# Patient Record
Sex: MALE | Race: WHITE
[De-identification: names, ages, dates, MRNs, and addresses within clinical notes are randomized per-mention and may not be internally consistent; named-entity substitution may affect disease eponyms.]

---

## 2021-06-18 ENCOUNTER — HOSPITAL ENCOUNTER (OUTPATIENT)
Dept: HOSPITAL 53 - M WUC | Age: 41
End: 2021-06-18
Attending: PHYSICIAN ASSISTANT
Payer: COMMERCIAL

## 2021-06-18 DIAGNOSIS — R19.7: Primary | ICD-10-CM

## 2021-12-08 ENCOUNTER — HOSPITAL ENCOUNTER (OUTPATIENT)
Dept: HOSPITAL 53 - M OPP | Age: 41
Discharge: HOME | End: 2021-12-08
Attending: INTERNAL MEDICINE
Payer: COMMERCIAL

## 2021-12-08 VITALS — HEIGHT: 67 IN | WEIGHT: 200 LBS | BODY MASS INDEX: 31.39 KG/M2

## 2021-12-08 VITALS — DIASTOLIC BLOOD PRESSURE: 79 MMHG | SYSTOLIC BLOOD PRESSURE: 138 MMHG

## 2021-12-08 DIAGNOSIS — B96.81: ICD-10-CM

## 2021-12-08 DIAGNOSIS — K44.9: ICD-10-CM

## 2021-12-08 DIAGNOSIS — Z79.899: ICD-10-CM

## 2021-12-08 DIAGNOSIS — K64.8: ICD-10-CM

## 2021-12-08 DIAGNOSIS — G47.30: ICD-10-CM

## 2021-12-08 DIAGNOSIS — Z86.19: ICD-10-CM

## 2021-12-08 DIAGNOSIS — Z80.0: ICD-10-CM

## 2021-12-08 DIAGNOSIS — Z12.11: Primary | ICD-10-CM

## 2021-12-08 DIAGNOSIS — R10.13: ICD-10-CM

## 2021-12-08 NOTE — ROOR
________________________________________________________________________________

Patient Name: Kirby Francis         Procedure Date: 12/8/2021 9:47 AM

MRN: [id number]                          Account Number: [id number]

YOB: 1980               Age: 41

Room: [facility]                            Gender: Male

Note Status: Finalized                 

________________________________________________________________________________

 

Procedure:            Total Colonoscopy to Cecum + ileoscopy

Indications:          Screening in patient at increased risk: Family history 

                      of 1st-degree relative with colorectal cancer

Providers:            Christiano Emmanuel MD

Referring MD:         CHIP SAUNDERS MD

Requesting Provider:  

Medicines:            Monitored Anesthesia Care

Complications:        No immediate complications.

________________________________________________________________________________

Procedure:            Pre-Anesthesia Assessment:

                      - The heart rate, respiratory rate, oxygen saturations, 

                      blood pressure, adequacy of pulmonary ventilation, and 

                      response to care were monitored throughout the procedure.

                      The Colonoscope was introduced through the anus and 

                      advanced to the cecum, identified by appendiceal orifice 

                      and ileocecal valve. The colonoscopy was performed 

                      without difficulty. The patient tolerated the procedure 

                      well. The quality of the bowel preparation was excellent.

                                                                                

Findings:

     The perianal and digital rectal examinations were normal.

     Non-bleeding internal hemorrhoids were found during retroflexion. The 

     hemorrhoids were small and Grade I (internal hemorrhoids that do not 

     prolapse).

     No other significant abnormalities were identified in a careful 

     examination of the remainder of the colon.

     The terminal ileum appeared normal.

                                                                                

Impression:           - Non-bleeding internal hemorrhoids.

                      - The examined portion of the ileum was normal.

                      - No specimens collected.

                      - The exam was otherwise normal to the cecum.

Recommendation:       - Patient has a contact number available for 

                      emergencies. The signs and symptoms of potential delayed 

                      complications were discussed with the patient. Return to 

                      normal activities tomorrow. Written discharge 

                      instructions were provided to the patient.

                      - High fiber diet.

                      - Discharge patient to home.

                      - Continue present medications.

                      - Repeat colonoscopy in 10 years for screening purposes.

                      - Return to referring physician.

                      - The findings and recommendations were discussed with 

                      the patient.

                                                                                

Procedure Code(s):    --- Professional ---

                      , Colorectal cancer screening; colonoscopy on 

                      individual at high risk

Diagnosis Code(s):    --- Professional ---

                      Z80.0, Family history of malignant neoplasm of digestive 

                      organs

                      K64.0, First degree hemorrhoids

 

CPT copyright 2019 American Medical [employer]. All rights reserved.

 

The codes documented in this report are preliminary and upon  review may 

be revised to meet current compliance requirements.

 

Christiano Emmanuel MD

____________________

Christiano Emmanuel MD

12/8/2021 10:18:15 AM

Electronically signed by Christiano Emmanuel MD

Number of Addenda: 0

 

Note Initiated On: 12/8/2021 9:47 AM

Estimated Blood Loss: Estimated blood loss: none.

## 2021-12-08 NOTE — CCD
Continuity of Care Document (CCD)

                             Created on: 10/12/2021



Kirby Francis

External Reference #: [id number]

: 1980

Sex: Male



Demographics





                          Address                   [address] [place] [address]  [address]

 

                          Home Phone                [phone]

 

                          Preferred Language        Unknown

 

                          Marital Status            Unknown

 

                          Zoroastrianism Affiliation     Unknown

 

                          Race                      Unknown

 

                          Ethnic Group              Unknown





Author





                          Author                    Kirby EMMANUEL M.D.

 

                          Organization              Unknown

 

                          Address                   [address] [place] [address]  [address]



 

                          Phone                     [phone]







Care Team Providers





                    Care Team Member Name Role                Phone

 

                    Kirby Oshea AUTM                [phone]







Problems





                    Active Problems     Provider            Date

 

                    Helicobacter pylori gastrointestinal tract infection Christiano Emmanuel M.D. 

Onset: 10/12/2021

 

                                        Note: HISTORY OF H PYLORI 







Social History





                Type            Date            Description     Comments

 

                Birth Sex                       Unknown          

 

                ETOH Use                        Denies alcohol use  

 

                Tobacco Use     Start: Unknown  Patient has never smoked  







Allergies and adverse reactions





                                        Description

 

                                        No Known Drug Allergies







Medications





           Active Medications SIG        Qnty       Indications Ordering Provide

r Date

 

                                        Suprep Bowel Prep Kit                   

  17.5-3.13-1.6GM/177ML Solution        

             use as directed 354ml                     Christiano Emmanuel M.D. 

10/12/2021

 

           Zoloft                     100mg Tablets                             

                       Unknown    









Immunizations





                                        Description

 

                                        No Information Available







Vital Signs





                Date            Vital           Result          Comment

 

                10/12/2021 12:04pm Height          66 inches       5'6"

 

                    Weight              200.00 lb            

 

                    BP Systolic         123 mmHg             

 

                    BP Diastolic        83 mmHg              

 

                    Heart Rate          70 /min              

 

                    BMI (Body Mass Index) 32.3 kg/m2           

 

                    Weight              90.720 kg            

 

                    Body Temperature    98.6 F             







Results





                                        Description

 

                                        No Information Available







Procedures





                Date            Code            Description     Status

 

                10/12/2021      48115           Office/Outpatient New Moderate M

DM 45-59 Minutes Completed







Medical Devices





                                        Description

 

                                        No Information Available







Encounters





           Type       Date       Location   Provider   Dx         Diagnosis

 

           Office Visit 10/12/2021 11:30a Main Office Christiano Emmanuel M.D. K

21.9      

Gastro-esophageal reflux disease without esophagitis

 

                          K58.9                     Irritable bowel syndrome wit

hout diarrhea







Assessments





                Date            Code            Description     Provider

 

                10/12/2021      K21.9           Gastroesophageal reflux disease 

Christiano Emmanuel M.D.

 

                10/12/2021      K58.9           Irritable bowel syndrome Christiano Emmanuel M.D.







Plan of Treatment

Future Appointment(s):* 2021  6:30 am - Cynthia at Main Office

* 2021 11:45 am - Christiano Emmanuel M.D. at Main Office

10/12/2021 - Christiano Emmanuel M.D.* K21.9 Gastroesophageal reflux disease* 
  Comments:* 40 yo wm who presents for a colonoscopy/egd due to a h/o 
  diarrhea/abdominal pain/gerd/positive h/o H. Pylori.  Positive c/o abdominal 
  pain, no weight loss, positive change in bowel habits, or rectal bleeding. No 
  family h/o colon cancer. No h/o chest pain, or sob.  Plan:1. Colonoscopy + 
  egd2. Informed consent.





* K58.9 Irritable bowel syndrome* Comments:* As above.









Functional Status





                                        Description

 

                                        No Information Available







Mental Status





                                        Description

 

                                        No Information Available







Referrals





                Refer to Dr     Reason for Referral Status          Appt Date

 

                Christiano Emmanuel M.D.                 Created         

000

 

                                        [address] [place] [address] [phone]

## 2021-12-08 NOTE — ROOR
________________________________________________________________________________

Patient Name: Kirby Francis         Procedure Date: 12/8/2021 9:46 AM

MRN: [id number]                          Account Number: [id number]

YOB: 1980               Age: 41

Room: [facility]                            Gender: Male

Note Status: Finalized                 

________________________________________________________________________________

 

Procedure:            Upper Endoscopy + Biopsies

Indications:          Epigastric abdominal pain, Heartburn

Providers:            Christiano Emmanuel MD

Referring MD:         CHIP SAUNDERS MD

Requesting Provider:  

Medicines:            Monitored Anesthesia Care

Complications:        No immediate complications.

________________________________________________________________________________

Procedure:            Pre-Anesthesia Assessment:

                      - The heart rate, respiratory rate, oxygen saturations, 

                      blood pressure, adequacy of pulmonary ventilation, and 

                      response to care were monitored throughout the procedure.

                      The Endoscope was introduced through the mouth, and 

                      advanced to the second part of duodenum. The upper GI 

                      endoscopy was accomplished without difficulty. The 

                      patient tolerated the procedure well.

                                                                                

Findings:

     The Z-line was regular and was found 35 cm from the incisors. Multiple 

     biopsies were obtained with cold forceps for evaluation to rule out 

     Benjamin's Esophagus randomly at the gastroesophageal junction.

     A small hiatal hernia was present.

     No other significant abnormalities were identified in a careful 

     examination of the stomach.

     Biopsies were taken with a cold forceps in the gastric antrum for 

     Helicobacter pylori testing.

     The exam of the duodenum was otherwise normal.

                                                                                

Impression:           - Z-line regular, 35 cm from the incisors.

                      - Small hiatal hernia.

                      - Multiple biopsies were obtained at the 

                      gastroesophageal junction.

                      - Biopsies were taken with a cold forceps for 

                      Helicobacter pylori testing.

                      - The examination was otherwise normal.

Recommendation:       - Patient has a contact number available for 

                      emergencies. The signs and symptoms of potential delayed 

                      complications were discussed with the patient. Return to 

                      normal activities tomorrow. Written discharge 

                      instructions were provided to the patient.

                      - High fiber diet.

                      - Discharge patient to home.

                      - Follow an antireflux regimen.

                      - Continue present medications.

                      - Await pathology results.

                      - Telephone GI clinic for pathology results in 1 week.

                      - Return to referring physician.

                      - The findings and recommendations were discussed with 

                      the patient.

                                                                                

Procedure Code(s):    --- Professional ---

                      35232, Esophagogastroduodenoscopy, flexible, transoral; 

                      with biopsy, single or multiple

Diagnosis Code(s):    --- Professional ---

                      K44.9, Diaphragmatic hernia without obstruction or 

                      gangrene

                      R10.13, Epigastric pain

                      R12, Heartburn

 

CPT copyright 2019 American Medical Association. All rights reserved.

 

The codes documented in this report are preliminary and upon  review may 

be revised to meet current compliance requirements.

 

Christiano Emmanuel MD

____________________

Christiano Emmanuel MD

12/8/2021 10:03:15 AM

Electronically signed by Christiano Emmanuel MD

Number of Addenda: 0

 

Note Initiated On: 12/8/2021 9:46 AM

Estimated Blood Loss: Estimated blood loss: none.

## 2021-12-08 NOTE — CCD
Continuity of Care Document (CCD)

                             Created on: 10/12/2021



Kirby Francis

External Reference #: [id number]

: 1980

Sex: Male



Demographics





                          Address                   [address] [place] [address]  [address]

 

                          Home Phone                [phone]

 

                          Preferred Language        Unknown

 

                          Marital Status            Unknown

 

                          Samaritan Affiliation     Unknown

 

                          Race                      Unknown

 

                          Ethnic Group              Unknown





Author





                          Author                    Kirby EMMANUEL M.D.

 

                          Organization              Unknown

 

                          Address                   [address] [place] [address]  [address]



 

                          Phone                     [phone]







Care Team Providers





                    Care Team Member Name Role                Phone

 

                    Kirby Oshea AUTM                [phone]







Problems





                    Active Problems     Provider            Date

 

                    Helicobacter pylori gastrointestinal tract infection Christiano Emmanuel M.D. 

Onset: 10/12/2021

 

                                        Note: HISTORY OF H PYLORI 







Social History





                Type            Date            Description     Comments

 

                Birth Sex                       Unknown          

 

                ETOH Use                        Denies alcohol use  

 

                Tobacco Use     Start: Unknown  Patient has never smoked  







Allergies and adverse reactions





                                        Description

 

                                        No Known Drug Allergies







Medications





           Active Medications SIG        Qnty       Indications Ordering Provide

r Date

 

                                        Suprep Bowel Prep Kit                   

  17.5-3.13-1.6GM/177ML Solution        

             use as directed 354ml                     Christiano Emmanuel M.D. 

10/12/2021

 

           Zoloft                     100mg Tablets                             

                       Unknown    









Immunizations





                                        Description

 

                                        No Information Available







Vital Signs





                Date            Vital           Result          Comment

 

                10/12/2021 12:04pm Height          66 inches       5'6"

 

                    Weight              200.00 lb            

 

                    BP Systolic         123 mmHg             

 

                    BP Diastolic        83 mmHg              

 

                    Heart Rate          70 /min              

 

                    BMI (Body Mass Index) 32.3 kg/m2           

 

                    Weight              90.720 kg            

 

                    Body Temperature    98.6 F             







Results





                                        Description

 

                                        No Information Available







Procedures





                Date            Code            Description     Status

 

                10/12/2021      36876           Office/Outpatient New Moderate M

DM 45-59 Minutes Completed







Medical Devices





                                        Description

 

                                        No Information Available







Encounters





           Type       Date       Location   Provider   Dx         Diagnosis

 

           Office Visit 10/12/2021 11:30a Main Office Christiano Emmanuel M.D. K

21.9      

Gastro-esophageal reflux disease without esophagitis

 

                          K58.9                     Irritable bowel syndrome wit

hout diarrhea







Assessments





                Date            Code            Description     Provider

 

                10/12/2021      K21.9           Gastroesophageal reflux disease 

Christiano Emmanuel M.D.

 

                10/12/2021      K58.9           Irritable bowel syndrome Christiano Emmanuel M.D.







Plan of Treatment

Future Appointment(s):* 2021  6:30 am - Cynthia at Main Office

* 2021 11:45 am - Christiano Emmanuel M.D. at Main Office

10/12/2021 - Christiano Emmanuel M.D.* K21.9 Gastroesophageal reflux disease* 
  Comments:* 40 yo wm who presents for a colonoscopy/egd due to a h/o 
  diarrhea/abdominal pain/gerd/positive h/o H. Pylori.  Positive c/o abdominal 
  pain, no weight loss, positive change in bowel habits, or rectal bleeding. No 
  family h/o colon cancer. No h/o chest pain, or sob.  Plan:1. Colonoscopy + 
  egd2. Informed consent.





* K58.9 Irritable bowel syndrome* Comments:* As above.









Functional Status





                                        Description

 

                                        No Information Available







Mental Status





                                        Description

 

                                        No Information Available







Referrals





                Refer to Dr     Reason for Referral Status          Appt Date

 

                Christiano Emmanuel M.D.                 Created         

000

 

                                        [address] [place] [address] [phone]

## 2023-10-22 ENCOUNTER — HOSPITAL ENCOUNTER (OUTPATIENT)
Dept: HOSPITAL 53 - M SLEEP | Age: 43
End: 2023-10-22
Attending: NURSE PRACTITIONER
Payer: OTHER GOVERNMENT

## 2023-10-22 DIAGNOSIS — G47.33: Primary | ICD-10-CM
